# Patient Record
Sex: FEMALE | Race: WHITE | NOT HISPANIC OR LATINO
[De-identification: names, ages, dates, MRNs, and addresses within clinical notes are randomized per-mention and may not be internally consistent; named-entity substitution may affect disease eponyms.]

---

## 2021-09-22 ENCOUNTER — APPOINTMENT (OUTPATIENT)
Dept: DERMATOLOGY | Facility: CLINIC | Age: 56
End: 2021-09-22

## 2021-09-23 ENCOUNTER — NON-APPOINTMENT (OUTPATIENT)
Age: 56
End: 2021-09-23

## 2021-09-23 ENCOUNTER — APPOINTMENT (OUTPATIENT)
Dept: DERMATOLOGY | Facility: CLINIC | Age: 56
End: 2021-09-23
Payer: COMMERCIAL

## 2021-09-23 VITALS — HEIGHT: 66 IN | BODY MASS INDEX: 26.36 KG/M2 | WEIGHT: 164 LBS

## 2021-09-23 DIAGNOSIS — Z12.83 ENCOUNTER FOR SCREENING FOR MALIGNANT NEOPLASM OF SKIN: ICD-10-CM

## 2021-09-23 DIAGNOSIS — L81.4 OTHER MELANIN HYPERPIGMENTATION: ICD-10-CM

## 2021-09-23 DIAGNOSIS — L21.9 SEBORRHEIC DERMATITIS, UNSPECIFIED: ICD-10-CM

## 2021-09-23 DIAGNOSIS — D22.9 MELANOCYTIC NEVI, UNSPECIFIED: ICD-10-CM

## 2021-09-23 DIAGNOSIS — L85.3 XEROSIS CUTIS: ICD-10-CM

## 2021-09-23 PROCEDURE — 99204 OFFICE O/P NEW MOD 45 MIN: CPT

## 2021-09-23 RX ORDER — MOMETASONE FUROATE 1 MG/G
0.1 CREAM TOPICAL TWICE DAILY
Qty: 1 | Refills: 3 | Status: ACTIVE | COMMUNITY
Start: 2021-09-23 | End: 1900-01-01

## 2021-09-23 NOTE — HISTORY OF PRESENT ILLNESS
[FreeTextEntry1] : ashley [de-identified] : 56 year old female here with dry patchs and itches in ear. also skin check.

## 2021-09-23 NOTE — ASSESSMENT
[FreeTextEntry1] : 1) benign findings as above- education\par \par 2) xerosis\par cerave\par \par 3) seb derm\par mometasone cream BID PRN itch; SED

## 2021-09-23 NOTE — PHYSICAL EXAM
[FreeTextEntry3] : AAOx3, pleasant, NAD, no visual lymphadenopathy\par hair, scalp, face, nose, eyelids, ears, lips, oropharynx, neck, chest, abdomen, back, right arm, left arm, nails, and hands examined with all normal findings,\par pertinent findings include:\par \par scaling in ears\par xerosis\par multiple benign nevi and lentigines\par

## 2021-10-13 ENCOUNTER — RESULT REVIEW (OUTPATIENT)
Age: 56
End: 2021-10-13

## 2021-12-02 ENCOUNTER — APPOINTMENT (OUTPATIENT)
Dept: OTOLARYNGOLOGY | Facility: CLINIC | Age: 56
End: 2021-12-02
Payer: COMMERCIAL

## 2021-12-02 VITALS
SYSTOLIC BLOOD PRESSURE: 123 MMHG | HEIGHT: 66 IN | BODY MASS INDEX: 26.36 KG/M2 | DIASTOLIC BLOOD PRESSURE: 80 MMHG | HEART RATE: 84 BPM | WEIGHT: 164 LBS

## 2021-12-02 PROCEDURE — 99204 OFFICE O/P NEW MOD 45 MIN: CPT

## 2021-12-02 RX ORDER — DULAGLUTIDE 0.75 MG/.5ML
0.75 INJECTION, SOLUTION SUBCUTANEOUS
Refills: 0 | Status: ACTIVE | COMMUNITY

## 2021-12-02 RX ORDER — ATORVASTATIN CALCIUM 80 MG/1
80 TABLET, FILM COATED ORAL
Refills: 0 | Status: ACTIVE | COMMUNITY

## 2021-12-02 RX ORDER — DAPAGLIFLOZIN 10 MG/1
TABLET, FILM COATED ORAL
Refills: 0 | Status: ACTIVE | COMMUNITY

## 2021-12-02 RX ORDER — LOSARTAN POTASSIUM 25 MG/1
25 TABLET, FILM COATED ORAL
Refills: 0 | Status: ACTIVE | COMMUNITY

## 2021-12-02 RX ORDER — FENOFIBRATE 150 MG/1
CAPSULE ORAL
Refills: 0 | Status: ACTIVE | COMMUNITY

## 2021-12-02 RX ORDER — PANTOPRAZOLE 40 MG/1
40 TABLET, DELAYED RELEASE ORAL
Refills: 0 | Status: ACTIVE | COMMUNITY

## 2021-12-02 RX ORDER — CETIRIZINE HYDROCHLORIDE 5 MG/1
TABLET ORAL
Refills: 0 | Status: ACTIVE | COMMUNITY

## 2021-12-02 RX ORDER — EZETIMIBE 10 MG/1
10 TABLET ORAL
Refills: 0 | Status: ACTIVE | COMMUNITY

## 2021-12-02 RX ORDER — METFORMIN HYDROCHLORIDE 1000 MG/1
1000 TABLET, COATED ORAL
Refills: 0 | Status: ACTIVE | COMMUNITY

## 2021-12-02 RX ORDER — ATENOLOL 50 MG/1
50 TABLET ORAL
Refills: 0 | Status: ACTIVE | COMMUNITY

## 2021-12-02 RX ORDER — DULOXETINE HYDROCHLORIDE 30 MG/1
30 CAPSULE, DELAYED RELEASE PELLETS ORAL
Refills: 0 | Status: ACTIVE | COMMUNITY

## 2021-12-02 NOTE — REVIEW OF SYSTEMS
[Seasonal Allergies] : seasonal allergies [Post Nasal Drip] : post nasal drip [Ear Pain] : ear pain [Ear Itch] : ear itch [Hearing Loss] : hearing loss [Dizziness] : dizziness [Vertigo] : vertigo [Nasal Congestion] : nasal congestion [Problem Snoring] : problem snoring [Heartburn] : heartburn [Negative] : Heme/Lymph [FreeTextEntry1] : difficulty swallowing, sweating at night, feel warmer than others

## 2021-12-02 NOTE — PHYSICAL EXAM
[de-identified] : EAD AS filled with mucoid fungal appearing debris [de-identified] : AD clear, AS red, swollen, wet, intact [Normal] : mucosa is normal [Midline] : trachea located in midline position

## 2021-12-02 NOTE — HISTORY OF PRESENT ILLNESS
[de-identified] : 56 yr old female had cerumen impaction, had ear flushed at [facility] 2 weeks ago.  A few days later, she blew her nose, heard a pop and had pain AS.\par c/o muffled hearing\par +tinnitus AS\par +dizzy w off balance, no vertigo\par +otorrhea was bloody, seems to have stopped\par \par Internist dx poss perf AS and fluid AD\par \par -hx otitis, noise exp, head trauma, FH

## 2021-12-09 ENCOUNTER — APPOINTMENT (OUTPATIENT)
Dept: OTOLARYNGOLOGY | Facility: CLINIC | Age: 56
End: 2021-12-09
Payer: COMMERCIAL

## 2021-12-09 VITALS
DIASTOLIC BLOOD PRESSURE: 77 MMHG | HEART RATE: 76 BPM | HEIGHT: 66 IN | SYSTOLIC BLOOD PRESSURE: 127 MMHG | BODY MASS INDEX: 26.36 KG/M2 | WEIGHT: 164 LBS

## 2021-12-09 PROCEDURE — 99213 OFFICE O/P EST LOW 20 MIN: CPT

## 2021-12-09 NOTE — HISTORY OF PRESENT ILLNESS
[de-identified] : 56 yr old female had cerumen impaction, had ear flushed at [facility] 2 weeks ago.  A few days later, she blew her nose, heard a pop and had pain AS.\par c/o muffled hearing\par +tinnitus AS\par +dizzy w off balance, no vertigo\par +otorrhea was bloody, seems to have stopped\par \par Internist dx poss perf AS and fluid AD\par \par -hx otitis, noise exp, head trauma, FH

## 2021-12-09 NOTE — PHYSICAL EXAM
[de-identified] : EAD AS min mucoid debris on TM [de-identified] : thickened AS [Normal] : mucosa is normal [Midline] : trachea located in midline position

## 2021-12-09 NOTE — REVIEW OF SYSTEMS
[Seasonal Allergies] : seasonal allergies [Post Nasal Drip] : post nasal drip [As Noted in HPI] : as noted in HPI [Ear Pain] : ear pain [Ear Itch] : ear itch [Hearing Loss] : hearing loss [Dizziness] : no dizziness [Vertigo] : no vertigo [Nasal Congestion] : nasal congestion [Problem Snoring] : problem snoring [Heartburn] : heartburn [Negative] : Heme/Lymph [FreeTextEntry1] : difficulty swallowing, sweating at night, feel warmer than others

## 2021-12-16 ENCOUNTER — APPOINTMENT (OUTPATIENT)
Dept: OTOLARYNGOLOGY | Facility: CLINIC | Age: 56
End: 2021-12-16
Payer: COMMERCIAL

## 2021-12-16 VITALS
WEIGHT: 164 LBS | HEART RATE: 76 BPM | BODY MASS INDEX: 26.36 KG/M2 | DIASTOLIC BLOOD PRESSURE: 73 MMHG | SYSTOLIC BLOOD PRESSURE: 138 MMHG | HEIGHT: 66 IN

## 2021-12-16 PROCEDURE — 99213 OFFICE O/P EST LOW 20 MIN: CPT

## 2021-12-16 NOTE — HISTORY OF PRESENT ILLNESS
[de-identified] : 56 yr old female w otomycosis AS tx twice with clotrimazole cream instilled.  Had some pain after instillation on 12/2, resolved except for occ twinge.  c/o itch AS

## 2021-12-16 NOTE — PHYSICAL EXAM
[de-identified] : EAC AS a few fungal spores medially in EAC [de-identified] : thickened AS [Normal] : mucosa is normal [Midline] : trachea located in midline position

## 2021-12-23 ENCOUNTER — APPOINTMENT (OUTPATIENT)
Dept: OTOLARYNGOLOGY | Facility: CLINIC | Age: 56
End: 2021-12-23
Payer: COMMERCIAL

## 2021-12-23 VITALS
BODY MASS INDEX: 26.36 KG/M2 | DIASTOLIC BLOOD PRESSURE: 72 MMHG | HEIGHT: 66 IN | WEIGHT: 164 LBS | SYSTOLIC BLOOD PRESSURE: 116 MMHG | HEART RATE: 68 BPM

## 2021-12-23 PROCEDURE — 99213 OFFICE O/P EST LOW 20 MIN: CPT

## 2021-12-23 NOTE — ASSESSMENT
[FreeTextEntry1] : otomycosis improved\par boric acid powder instilled\par H2O precautions\par f/u 2 weeks

## 2021-12-23 NOTE — HISTORY OF PRESENT ILLNESS
[de-identified] : 56 yr old female w otomycosis AS tx twice with clotrimazole cream and once with boric acid.  Still has some twinges of pain and itch.

## 2021-12-23 NOTE — PHYSICAL EXAM
[de-identified] : EAC AS no fungal spores seen [de-identified] : thickened AS [Normal] : mucosa is normal [Midline] : trachea located in midline position

## 2022-01-06 ENCOUNTER — APPOINTMENT (OUTPATIENT)
Dept: OTOLARYNGOLOGY | Facility: CLINIC | Age: 57
End: 2022-01-06
Payer: COMMERCIAL

## 2022-01-06 VITALS
DIASTOLIC BLOOD PRESSURE: 79 MMHG | HEART RATE: 76 BPM | SYSTOLIC BLOOD PRESSURE: 126 MMHG | HEIGHT: 66 IN | WEIGHT: 160 LBS | BODY MASS INDEX: 25.71 KG/M2

## 2022-01-06 PROCEDURE — 99213 OFFICE O/P EST LOW 20 MIN: CPT

## 2022-01-06 NOTE — PHYSICAL EXAM
[de-identified] : min thickened AS [Normal] : mucosa is normal [Midline] : trachea located in midline position

## 2022-01-06 NOTE — HISTORY OF PRESENT ILLNESS
[de-identified] : 56 yr old female w otomycosis AS tx w clotrimazole twice then boric acid powder twice.\par no more itch or pain\par still feels clogged\par flew without otalgia

## 2022-01-06 NOTE — REVIEW OF SYSTEMS
[Seasonal Allergies] : seasonal allergies [Post Nasal Drip] : post nasal drip [As Noted in HPI] : as noted in HPI [Ear Pain] : no ear pain [Ear Itch] : no ear itch [Hearing Loss] : hearing loss [Dizziness] : no dizziness [Vertigo] : no vertigo [Nasal Congestion] : nasal congestion [Problem Snoring] : problem snoring [Heartburn] : heartburn [Negative] : Heme/Lymph [FreeTextEntry1] : difficulty swallowing, sweating at night, feel warmer than others

## 2022-08-08 ENCOUNTER — APPOINTMENT (OUTPATIENT)
Dept: OTOLARYNGOLOGY | Facility: CLINIC | Age: 57
End: 2022-08-08

## 2022-08-08 VITALS
BODY MASS INDEX: 26.03 KG/M2 | HEIGHT: 66 IN | HEART RATE: 85 BPM | DIASTOLIC BLOOD PRESSURE: 74 MMHG | SYSTOLIC BLOOD PRESSURE: 121 MMHG | WEIGHT: 162 LBS

## 2022-08-08 PROCEDURE — 99213 OFFICE O/P EST LOW 20 MIN: CPT

## 2022-08-08 RX ORDER — ACYCLOVIR 200 MG/1
200 CAPSULE ORAL
Qty: 21 | Refills: 0 | Status: DISCONTINUED | COMMUNITY
Start: 2022-03-10

## 2022-08-08 RX ORDER — NIRMATRELVIR AND RITONAVIR 300-100 MG
20 X 150 MG & KIT ORAL
Qty: 30 | Refills: 0 | Status: DISCONTINUED | COMMUNITY
Start: 2022-05-01

## 2022-08-08 RX ORDER — LANCETS 28 GAUGE
EACH MISCELLANEOUS
Qty: 100 | Refills: 0 | Status: ACTIVE | COMMUNITY
Start: 2022-02-04

## 2022-08-08 RX ORDER — BLOOD SUGAR DIAGNOSTIC
STRIP MISCELLANEOUS
Qty: 100 | Refills: 0 | Status: ACTIVE | COMMUNITY
Start: 2022-02-04

## 2022-08-08 RX ORDER — ESTRADIOL AND NORETHINDRONE ACETATE 1; .5 MG/1; MG/1
1-0.5 TABLET, FILM COATED ORAL
Qty: 28 | Refills: 0 | Status: ACTIVE | COMMUNITY
Start: 2022-07-12

## 2022-08-08 RX ORDER — SCOPOLAMINE 1.5 MG/1
1 PATCH, EXTENDED RELEASE TRANSDERMAL
Qty: 4 | Refills: 0 | Status: ACTIVE | COMMUNITY
Start: 2022-06-02

## 2022-08-08 NOTE — HISTORY OF PRESENT ILLNESS
[de-identified] : 56 yr old female w hx of otomycosis AS.  Recent otalgia  AS w black otorrhea 8/4\par c/o itch AS

## 2022-08-08 NOTE — REVIEW OF SYSTEMS
[Seasonal Allergies] : seasonal allergies [Post Nasal Drip] : post nasal drip [As Noted in HPI] : as noted in HPI [Hearing Loss] : hearing loss [Nasal Congestion] : nasal congestion [Problem Snoring] : problem snoring [Heartburn] : heartburn [Negative] : Heme/Lymph [Ear Pain] : no ear pain [Ear Itch] : no ear itch [Dizziness] : no dizziness [Vertigo] : no vertigo [FreeTextEntry1] : difficulty swallowing, sweating at night, feel warmer than others

## 2022-08-08 NOTE — PHYSICAL EXAM
[Normal] : mucosa is normal [Midline] : trachea located in midline position [de-identified] : min black debris AS

## 2022-08-18 ENCOUNTER — APPOINTMENT (OUTPATIENT)
Dept: OTOLARYNGOLOGY | Facility: CLINIC | Age: 57
End: 2022-08-18

## 2022-08-18 VITALS
HEIGHT: 66 IN | SYSTOLIC BLOOD PRESSURE: 146 MMHG | HEART RATE: 71 BPM | DIASTOLIC BLOOD PRESSURE: 78 MMHG | BODY MASS INDEX: 26.03 KG/M2 | WEIGHT: 162 LBS

## 2022-08-18 PROCEDURE — 99213 OFFICE O/P EST LOW 20 MIN: CPT

## 2022-08-18 NOTE — PHYSICAL EXAM
[de-identified] : boric acid powder sucitioned out, no fungus seen [Normal] : mucosa is normal [Midline] : trachea located in midline position

## 2022-08-30 ENCOUNTER — APPOINTMENT (OUTPATIENT)
Dept: OTOLARYNGOLOGY | Facility: CLINIC | Age: 57
End: 2022-08-30

## 2022-08-30 VITALS
HEART RATE: 69 BPM | BODY MASS INDEX: 26.03 KG/M2 | SYSTOLIC BLOOD PRESSURE: 129 MMHG | HEIGHT: 66 IN | WEIGHT: 162 LBS | DIASTOLIC BLOOD PRESSURE: 70 MMHG

## 2022-08-30 DIAGNOSIS — H62.42 SUPERFICIAL MYCOSIS, UNSPECIFIED: ICD-10-CM

## 2022-08-30 DIAGNOSIS — B36.9 SUPERFICIAL MYCOSIS, UNSPECIFIED: ICD-10-CM

## 2022-08-30 PROCEDURE — 99213 OFFICE O/P EST LOW 20 MIN: CPT

## 2022-08-30 NOTE — HISTORY OF PRESENT ILLNESS
[de-identified] : 57 yr old female w otomycosis AS tx w Boric acid powder 8/8 and 8/18.\par feels much better, only occasional itch

## 2022-12-28 ENCOUNTER — OFFICE (OUTPATIENT)
Dept: URBAN - METROPOLITAN AREA CLINIC 90 | Facility: CLINIC | Age: 57
Setting detail: OPHTHALMOLOGY
End: 2022-12-28
Payer: COMMERCIAL

## 2022-12-28 DIAGNOSIS — H25.13: ICD-10-CM

## 2022-12-28 DIAGNOSIS — H43.392: ICD-10-CM

## 2022-12-28 DIAGNOSIS — H40.013: ICD-10-CM

## 2022-12-28 DIAGNOSIS — E11.9: ICD-10-CM

## 2022-12-28 PROCEDURE — 92133 CPTRZD OPH DX IMG PST SGM ON: CPT | Performed by: OPHTHALMOLOGY

## 2022-12-28 PROCEDURE — 92083 EXTENDED VISUAL FIELD XM: CPT | Performed by: OPHTHALMOLOGY

## 2022-12-28 PROCEDURE — 92014 COMPRE OPH EXAM EST PT 1/>: CPT | Performed by: OPHTHALMOLOGY

## 2022-12-28 ASSESSMENT — REFRACTION_MANIFEST
OD_CYLINDER: -0.50
OD_CYLINDER: SPH
OS_VA1: 20/25+2
OU_VA: 20/20
OS_AXIS: 170
OD_AXIS: 075
OD_SPHERE: +0.75
OS_AXIS: 080
OS_SPHERE: +0.50
OD_SPHERE: +0.50
OS_SPHERE: PLANO
OS_VA1: 20/20
OS_ADD: +3.00
OS_AXIS: 170
OS_VA1: 20/20
OD_VA1: 20/20+
OD_CYLINDER: -0.50
OS_CYLINDER: -1.75
OD_AXIS: 075
OD_VA1: 20/20-1
OS_SPHERE: +1.50
OS_SPHERE: +1.50
OD_ADD: +3.00
OS_VA1: 20/25+2
OS_CYLINDER: +1.25
OD_AXIS: 155
OD_ADD: +2.75
OD_SPHERE: +0.75
OS_CYLINDER: +1.25
OS_ADD: +3.00
OS_VA2: 20/20
OU_VA: 20/20
OD_VA1: 20/20
OD_SPHERE: +0.75
OD_ADD: +2.75
OS_ADD: +2.75
OD_ADD: +3.00
OU_VA: 20/20
OD_CYLINDER: +0.25
OD_VA2: 20/20
OS_CYLINDER: -1.75
OS_ADD: +2.75
OS_AXIS: 080
OD_VA1: 20/20-1

## 2022-12-28 ASSESSMENT — REFRACTION_CURRENTRX
OD_SPHERE: +1.25
OD_AXIS: 165
OS_CYLINDER: -1.25
OD_OVR_VA: 20/
OS_ADD: +2.00
OS_OVR_VA: 20/
OS_SPHERE: PLANO
OD_CYLINDER: +0.25
OS_VPRISM_DIRECTION: PROGS
OD_SPHERE: +0.75
OD_VPRISM_DIRECTION: PROGS
OS_SPHERE: +1.25
OS_CYLINDER: +1.25
OS_AXIS: 168
OD_ADD: +2.75
OS_OVR_VA: 20/
OD_CYLINDER: SPHERE
OS_AXIS: 082
OS_VPRISM_DIRECTION: PROGS
OS_ADD: +2.75
OD_VPRISM_DIRECTION: PROGS
OD_ADD: +2.00
OD_OVR_VA: 20/

## 2022-12-28 ASSESSMENT — AXIALLENGTH_DERIVED
OD_AL: 23.175
OS_AL: 23.1228
OD_AL: 23.1281
OD_AL: 22.8965
OS_AL: 23.2167
OS_AL: 23.2167
OS_AL: 23.0296
OD_AL: 23.175

## 2022-12-28 ASSESSMENT — KERATOMETRY
OS_K1POWER_DIOPTERS: 43.75
OD_K1POWER_DIOPTERS: 44.00
OS_K2POWER_DIOPTERS: 44.00
OD_K2POWER_DIOPTERS: 44.25
OD_AXISANGLE_DEGREES: 048
OS_AXISANGLE_DEGREES: 157
METHOD_AUTO_MANUAL: AUTO

## 2022-12-28 ASSESSMENT — REFRACTION_AUTOREFRACTION
OD_SPHERE: +1.50
OS_CYLINDER: -1.75
OS_SPHERE: +1.75
OD_AXIS: 096
OD_CYLINDER: -0.50
OS_AXIS: 087

## 2022-12-28 ASSESSMENT — SPHEQUIV_DERIVED
OD_SPHEQUIV: 1.25
OD_SPHEQUIV: 0.5
OS_SPHEQUIV: 0.875
OD_SPHEQUIV: 0.625
OS_SPHEQUIV: 1.125
OS_SPHEQUIV: 0.625
OS_SPHEQUIV: 0.625
OD_SPHEQUIV: 0.5

## 2022-12-28 ASSESSMENT — VISUAL ACUITY
OS_BCVA: 20/20
OD_BCVA: 20/20

## 2022-12-28 ASSESSMENT — CONFRONTATIONAL VISUAL FIELD TEST (CVF)
OD_FINDINGS: FULL
OS_FINDINGS: FULL

## 2022-12-28 ASSESSMENT — TONOMETRY
OS_IOP_MMHG: 18
OD_IOP_MMHG: 18

## 2023-12-13 ENCOUNTER — OFFICE (OUTPATIENT)
Dept: URBAN - METROPOLITAN AREA CLINIC 90 | Facility: CLINIC | Age: 58
Setting detail: OPHTHALMOLOGY
End: 2023-12-13
Payer: COMMERCIAL

## 2023-12-13 DIAGNOSIS — H43.392: ICD-10-CM

## 2023-12-13 DIAGNOSIS — H25.13: ICD-10-CM

## 2023-12-13 DIAGNOSIS — E11.9: ICD-10-CM

## 2023-12-13 DIAGNOSIS — H40.013: ICD-10-CM

## 2023-12-13 PROCEDURE — 92014 COMPRE OPH EXAM EST PT 1/>: CPT | Performed by: OPHTHALMOLOGY

## 2023-12-13 PROCEDURE — 92133 CPTRZD OPH DX IMG PST SGM ON: CPT | Performed by: OPHTHALMOLOGY

## 2023-12-13 PROCEDURE — 92083 EXTENDED VISUAL FIELD XM: CPT | Performed by: OPHTHALMOLOGY

## 2023-12-13 ASSESSMENT — SPHEQUIV_DERIVED
OS_SPHEQUIV: 1.125
OD_SPHEQUIV: 0.5
OD_SPHEQUIV: 0.625
OS_SPHEQUIV: 0.625
OD_SPHEQUIV: 0.5
OD_SPHEQUIV: 1.125
OD_SPHEQUIV: 0.5
OS_SPHEQUIV: 1.25
OS_SPHEQUIV: 0.625
OS_SPHEQUIV: 0.625

## 2023-12-13 ASSESSMENT — REFRACTION_MANIFEST
OD_SPHERE: +0.75
OD_ADD: +3.00
OS_VA1: 20/25+2
OD_CYLINDER: -0.50
OD_SPHERE: +0.75
OS_SPHERE: PLANO
OS_VA1: 20/25+2
OS_CYLINDER: -1.75
OS_ADD: +2.75
OS_SPHERE: +1.50
OS_CYLINDER: -1.75
OD_SPHERE: +0.75
OS_VA1: 20/25+2
OU_VA: 20/20
OD_SPHERE: +0.50
OS_AXIS: 170
OS_SPHERE: +0.50
OD_VA1: 20/20-1
OD_VA2: 20/20
OD_ADD: +2.75
OS_AXIS: 080
OS_ADD: +3.00
OS_SPHERE: +1.50
OD_CYLINDER: +0.25
OD_ADD: +3.00
OU_VA: 20/20
OD_SPHERE: +0.75
OD_VA1: 20/20-1
OS_VA1: 20/20
OS_ADD: +3.00
OU_VA: 20/20
OD_CYLINDER: -0.50
OD_ADD: +2.75
OS_VA2: 20/20
OS_VA1: 20/20
OS_AXIS: 080
OS_AXIS: 170
OD_AXIS: 075
OS_SPHERE: +1.50
OD_AXIS: 075
OS_CYLINDER: +1.25
OD_VA1: 20/20-1
OS_ADD: +2.75
OD_CYLINDER: SPH
OS_AXIS: 080
OS_CYLINDER: +1.25
OS_ADD: +3.00
OD_VA1: 20/20+
OD_AXIS: 155
OD_CYLINDER: -0.50
OS_CYLINDER: -1.75
OU_VA: 20/20
OD_ADD: +3.00
OD_VA1: 20/20
OD_AXIS: 075

## 2023-12-13 ASSESSMENT — REFRACTION_CURRENTRX
OD_SPHERE: +0.75
OD_CYLINDER: +0.25
OS_AXIS: 080
OD_OVR_VA: 20/
OD_OVR_VA: 20/
OS_OVR_VA: 20/
OD_VPRISM_DIRECTION: PROGS
OS_VPRISM_DIRECTION: PROGS
OS_ADD: +2.75
OS_VPRISM_DIRECTION: PROGS
OS_ADD: +2.00
OD_SPHERE: +0.75
OS_OVR_VA: 20/
OD_AXIS: 165
OD_AXIS: 085
OS_ADD: +2.75
OD_ADD: +2.75
OS_AXIS: 082
OD_SPHERE: +1.25
OD_OVR_VA: 20/
OD_ADD: +2.75
OS_AXIS: 168
OS_CYLINDER: -1.50
OD_VPRISM_DIRECTION: PROGS
OD_CYLINDER: -0.25
OS_SPHERE: +1.50
OS_OVR_VA: 20/
OS_SPHERE: PLANO
OD_ADD: +2.00
OD_CYLINDER: SPHERE
OS_SPHERE: +1.25
OS_VPRISM_DIRECTION: PROGS
OS_CYLINDER: +1.25
OS_CYLINDER: -1.25
OD_VPRISM_DIRECTION: PROGS

## 2023-12-13 ASSESSMENT — REFRACTION_AUTOREFRACTION
OS_SPHERE: +2.00
OD_CYLINDER: -0.75
OS_CYLINDER: -1.50
OD_SPHERE: +1.50
OD_AXIS: 092
OS_AXIS: 080

## 2024-11-02 ENCOUNTER — APPOINTMENT (OUTPATIENT)
Dept: CT IMAGING | Facility: IMAGING CENTER | Age: 59
End: 2024-11-02

## 2024-11-02 ENCOUNTER — OUTPATIENT (OUTPATIENT)
Dept: OUTPATIENT SERVICES | Facility: HOSPITAL | Age: 59
LOS: 1 days | End: 2024-11-02
Payer: COMMERCIAL

## 2024-11-02 DIAGNOSIS — Z00.8 ENCOUNTER FOR OTHER GENERAL EXAMINATION: ICD-10-CM

## 2024-11-02 PROCEDURE — 74160 CT ABDOMEN W/CONTRAST: CPT

## 2024-11-02 PROCEDURE — 74160 CT ABDOMEN W/CONTRAST: CPT | Mod: 26

## 2024-12-24 ENCOUNTER — APPOINTMENT (OUTPATIENT)
Dept: OTOLARYNGOLOGY | Facility: CLINIC | Age: 59
End: 2024-12-24
Payer: COMMERCIAL

## 2024-12-24 VITALS
WEIGHT: 155 LBS | BODY MASS INDEX: 25.83 KG/M2 | DIASTOLIC BLOOD PRESSURE: 72 MMHG | SYSTOLIC BLOOD PRESSURE: 130 MMHG | HEIGHT: 65 IN | HEART RATE: 69 BPM

## 2024-12-24 DIAGNOSIS — R06.83 SNORING: ICD-10-CM

## 2024-12-24 DIAGNOSIS — H61.22 IMPACTED CERUMEN, LEFT EAR: ICD-10-CM

## 2024-12-24 DIAGNOSIS — H60.62 UNSPECIFIED CHRONIC OTITIS EXTERNA, LEFT EAR: ICD-10-CM

## 2024-12-24 PROCEDURE — 99213 OFFICE O/P EST LOW 20 MIN: CPT | Mod: 25

## 2024-12-24 PROCEDURE — 69210 REMOVE IMPACTED EAR WAX UNI: CPT

## 2024-12-24 RX ORDER — MOMETASONE FUROATE 1 MG/G
0.1 CREAM TOPICAL TWICE DAILY
Qty: 1 | Refills: 3 | Status: ACTIVE | COMMUNITY
Start: 2024-12-24 | End: 1900-01-01

## 2024-12-24 RX ORDER — ORAL SEMAGLUTIDE 14 MG/1
TABLET ORAL
Refills: 0 | Status: ACTIVE | COMMUNITY

## 2025-05-28 ENCOUNTER — OFFICE (OUTPATIENT)
Facility: LOCATION | Age: 60
Setting detail: OPHTHALMOLOGY
End: 2025-05-28
Payer: COMMERCIAL

## 2025-05-28 DIAGNOSIS — H40.013: ICD-10-CM

## 2025-05-28 DIAGNOSIS — H43.392: ICD-10-CM

## 2025-05-28 DIAGNOSIS — H25.13: ICD-10-CM

## 2025-05-28 DIAGNOSIS — E11.9: ICD-10-CM

## 2025-05-28 PROCEDURE — 92014 COMPRE OPH EXAM EST PT 1/>: CPT | Performed by: OPHTHALMOLOGY

## 2025-05-28 PROCEDURE — 92133 CPTRZD OPH DX IMG PST SGM ON: CPT | Performed by: OPHTHALMOLOGY

## 2025-05-28 PROCEDURE — 92083 EXTENDED VISUAL FIELD XM: CPT | Performed by: OPHTHALMOLOGY

## 2025-05-28 ASSESSMENT — TONOMETRY
OD_IOP_MMHG: 15
OS_IOP_MMHG: 14

## 2025-05-28 ASSESSMENT — REFRACTION_MANIFEST
OD_SPHERE: +0.75
OD_CYLINDER: SPH
OS_CYLINDER: +1.25
OD_CYLINDER: +0.25
OD_ADD: +3.00
OS_VA1: 20/25+2
OD_ADD: +3.00
OD_ADD: +2.75
OS_VA1: 20/25+2
OU_VA: 20/20
OS_ADD: +3.00
OS_ADD: +3.00
OS_SPHERE: +1.50
OD_AXIS: 075
OD_VA1: 20/20-1
OS_AXIS: 080
OS_CYLINDER: -1.75
OD_SPHERE: +0.50
OD_ADD: +2.75
OU_VA: 20/20
OS_ADD: +3.00
OS_CYLINDER: -1.75
OS_VA1: 20/20
OD_AXIS: 075
OD_CYLINDER: -0.50
OD_SPHERE: +0.75
OS_AXIS: 170
OU_VA: 20/20
OS_VA1: 20/20
OS_ADD: +3.00
OD_SPHERE: +0.75
OS_CYLINDER: +1.25
OS_CYLINDER: -1.75
OD_VA1: 20/20-1
OD_AXIS: 075
OD_VA1: 20/20-1
OD_SPHERE: +0.75
OS_SPHERE: PLANO
OU_VA: 20/20
OD_VA1: 20/20-1
OD_VA1: 20/20
OD_AXIS: 155
OS_SPHERE: +1.50
OS_AXIS: 080
OS_VA1: 20/25+2
OD_SPHERE: +0.75
OS_AXIS: 170
OS_SPHERE: +1.50
OU_VA: 20/20
OD_CYLINDER: -0.50
OD_ADD: +3.00
OD_VA1: 20/20+
OD_VA2: 20/20
OS_AXIS: 080
OD_ADD: +3.00
OS_ADD: +2.75
OS_CYLINDER: -1.75
OS_VA2: 20/20
OS_AXIS: 080
OS_ADD: +2.75
OS_SPHERE: +0.50
OD_CYLINDER: -0.50
OS_SPHERE: +1.50
OD_CYLINDER: -0.50
OS_VA1: 20/25+2
OD_AXIS: 075

## 2025-05-28 ASSESSMENT — REFRACTION_AUTOREFRACTION
OS_SPHERE: +2.00
OS_AXIS: 076
OD_SPHERE: +1.75
OD_AXIS: 083
OD_CYLINDER: -0.75
OS_CYLINDER: -1.50

## 2025-05-28 ASSESSMENT — REFRACTION_CURRENTRX
OD_VPRISM_DIRECTION: PROGS
OS_SPHERE: PLANO
OD_SPHERE: +1.25
OS_SPHERE: +1.50
OS_ADD: +2.75
OS_VPRISM_DIRECTION: PROGS
OD_SPHERE: +0.75
OD_AXIS: 165
OS_AXIS: 075
OS_AXIS: 168
OS_SPHERE: +1.50
OD_SPHERE: +0.75
OD_VPRISM_DIRECTION: PROGS
OD_OVR_VA: 20/
OS_VPRISM_DIRECTION: PROGS
OS_SPHERE: +1.25
OS_AXIS: 082
OS_OVR_VA: 20/
OD_ADD: +2.75
OS_VPRISM_DIRECTION: PROGS
OD_OVR_VA: 20/
OD_OVR_VA: 20/
OD_SPHERE: +0.75
OS_CYLINDER: -1.50
OS_ADD: +2.50
OS_VPRISM_DIRECTION: PROGS
OD_AXIS: 085
OS_CYLINDER: +1.25
OS_AXIS: 080
OD_CYLINDER: +0.25
OD_VPRISM_DIRECTION: PROGS
OS_CYLINDER: -1.25
OD_AXIS: 071
OS_ADD: +2.75
OD_CYLINDER: -0.50
OD_ADD: +2.50
OS_OVR_VA: 20/
OD_CYLINDER: -0.25
OD_VPRISM_DIRECTION: PROGS
OS_OVR_VA: 20/
OD_CYLINDER: SPHERE
OS_CYLINDER: -1.50
OD_ADD: +2.75
OS_ADD: +2.00
OD_ADD: +2.00

## 2025-05-28 ASSESSMENT — CONFRONTATIONAL VISUAL FIELD TEST (CVF)
OS_FINDINGS: FULL
OD_FINDINGS: FULL

## 2025-05-28 ASSESSMENT — KERATOMETRY
OS_K2POWER_DIOPTERS: 44.00
OD_K2POWER_DIOPTERS: 44.25
METHOD_AUTO_MANUAL: AUTO
OD_AXISANGLE_DEGREES: 031
OD_K1POWER_DIOPTERS: 44.00
OS_AXISANGLE_DEGREES: 161
OS_K1POWER_DIOPTERS: 43.50

## 2025-05-28 ASSESSMENT — VISUAL ACUITY
OS_BCVA: 20/20+2
OD_BCVA: 20/25-1